# Patient Record
Sex: FEMALE | Race: BLACK OR AFRICAN AMERICAN | NOT HISPANIC OR LATINO | URBAN - METROPOLITAN AREA
[De-identification: names, ages, dates, MRNs, and addresses within clinical notes are randomized per-mention and may not be internally consistent; named-entity substitution may affect disease eponyms.]

---

## 2021-03-25 ENCOUNTER — OFFICE VISIT (OUTPATIENT)
Dept: URBAN - METROPOLITAN AREA CLINIC 118 | Facility: CLINIC | Age: 51
End: 2021-03-25
Payer: COMMERCIAL

## 2021-03-25 ENCOUNTER — LAB OUTSIDE AN ENCOUNTER (OUTPATIENT)
Dept: URBAN - METROPOLITAN AREA CLINIC 118 | Facility: CLINIC | Age: 51
End: 2021-03-25

## 2021-03-25 DIAGNOSIS — Z12.11 COLON CANCER SCREENING: ICD-10-CM

## 2021-03-25 PROCEDURE — G9903 PT SCRN TBCO ID AS NON USER: HCPCS | Performed by: INTERNAL MEDICINE

## 2021-03-25 PROCEDURE — 99202 OFFICE O/P NEW SF 15 MIN: CPT | Performed by: INTERNAL MEDICINE

## 2021-03-25 PROCEDURE — G8417 CALC BMI ABV UP PARAM F/U: HCPCS | Performed by: INTERNAL MEDICINE

## 2021-03-25 PROCEDURE — G8427 DOCREV CUR MEDS BY ELIG CLIN: HCPCS | Performed by: INTERNAL MEDICINE

## 2021-03-25 PROCEDURE — 1036F TOBACCO NON-USER: CPT | Performed by: INTERNAL MEDICINE

## 2021-03-25 PROCEDURE — 3017F COLORECTAL CA SCREEN DOC REV: CPT | Performed by: INTERNAL MEDICINE

## 2021-03-25 RX ORDER — ONDANSETRON HYDROCHLORIDE 4 MG/1
1 TABLET TABLET, FILM COATED ORAL
Qty: 3 TABLET | Refills: 0 | OUTPATIENT
Start: 2021-03-25

## 2021-03-25 RX ORDER — IBUPROFEN 800 MG/1
1 TABLET WITH FOOD OR MILK AS NEEDED TABLET ORAL THREE TIMES A DAY
Status: ACTIVE | COMMUNITY

## 2021-03-25 NOTE — HPI-TODAY'S VISIT:
This is a 51 yo female with pmh of HLD and urology surgeries for kidney reflux here for evaluation for colonoscopy. No prior exam. Denies any GI complaints. Maternal grandmother with colon cancer.

## 2021-04-13 ENCOUNTER — OFFICE VISIT (OUTPATIENT)
Dept: URBAN - METROPOLITAN AREA SURGERY CENTER 23 | Facility: SURGERY CENTER | Age: 51
End: 2021-04-13

## 2021-04-13 RX ORDER — IBUPROFEN 800 MG/1
1 TABLET WITH FOOD OR MILK AS NEEDED TABLET ORAL THREE TIMES A DAY
Status: ACTIVE | COMMUNITY

## 2021-04-13 RX ORDER — ONDANSETRON HYDROCHLORIDE 4 MG/1
1 TABLET TABLET, FILM COATED ORAL
Qty: 3 TABLET | Refills: 0 | Status: ACTIVE | COMMUNITY
Start: 2021-03-25

## 2021-10-26 ENCOUNTER — LAB OUTSIDE AN ENCOUNTER (OUTPATIENT)
Dept: URBAN - METROPOLITAN AREA CLINIC 118 | Facility: CLINIC | Age: 51
End: 2021-10-26

## 2021-10-26 ENCOUNTER — CLAIMS CREATED FROM THE CLAIM WINDOW (OUTPATIENT)
Dept: URBAN - METROPOLITAN AREA CLINIC 118 | Facility: CLINIC | Age: 51
End: 2021-10-26
Payer: COMMERCIAL

## 2021-10-26 ENCOUNTER — DASHBOARD ENCOUNTERS (OUTPATIENT)
Age: 51
End: 2021-10-26

## 2021-10-26 DIAGNOSIS — K21.9 GASTROESOPHAGEAL REFLUX DISEASE WITHOUT ESOPHAGITIS: ICD-10-CM

## 2021-10-26 DIAGNOSIS — K76.9 LIVER DISEASE: ICD-10-CM

## 2021-10-26 DIAGNOSIS — Z12.11 SCREEN FOR COLON CANCER: ICD-10-CM

## 2021-10-26 PROCEDURE — 99214 OFFICE O/P EST MOD 30 MIN: CPT | Performed by: INTERNAL MEDICINE

## 2021-10-26 RX ORDER — IBUPROFEN 800 MG/1
1 TABLET WITH FOOD OR MILK AS NEEDED TABLET ORAL THREE TIMES A DAY
Status: DISCONTINUED | COMMUNITY

## 2021-10-26 RX ORDER — ONDANSETRON HYDROCHLORIDE 4 MG/1
1 TABLET TABLET, FILM COATED ORAL
Qty: 3 TABLET | Refills: 0 | Status: DISCONTINUED | COMMUNITY
Start: 2021-03-25

## 2021-10-26 NOTE — HPI-TODAY'S VISIT:
Pt was diagnosed w/ MS now presents for evaluation. Pt reports h/o 'sensitive' stomach based on  diet with no particular food. Reprots some nausea w.o vomiting. Pt feels that she is tasting bile when  she eats and breathes. Pt not on meds for MS.Pt denies bowel changes. pt due for colon cancer screening.

## 2021-10-27 LAB
A/G RATIO: 1.6
ALBUMIN: 4.2
ALKALINE PHOSPHATASE: 82
AST (SGOT): 17
BASO (ABSOLUTE): 0
BASOS: 1
BILIRUBIN, TOTAL: <0.2
BUN/CREATININE RATIO: 10
BUN: 10
CALCIUM: 9
CHLORIDE: 102
CREATININE: 1.01
EGFR IF AFRICN AM: 76
EGFR IF NONAFRICN AM: 66
EOS (ABSOLUTE): 0.2
EOS: 5
GLOBULIN, TOTAL: 2.7
GLUCOSE: 81
HEMATOCRIT: 37.3
HEMATOLOGY COMMENTS:: (no result)
HEMOGLOBIN: 12.2
IMMATURE CELLS: (no result)
IMMATURE GRANS (ABS): 0
IMMATURE GRANULOCYTES: 0
LYMPHS (ABSOLUTE): 2
LYMPHS: 46
MCH: 29.8
MCHC: 32.7
MCV: 91
MONOCYTES(ABSOLUTE): 0.5
MONOCYTES: 11
NEUTROPHILS (ABSOLUTE): 1.6
NEUTROPHILS: 37
NRBC: (no result)
PLATELETS: 278
POTASSIUM: 4.2
PROTEIN, TOTAL: 6.9
RBC: 4.09
RDW: 13.1
SODIUM: 140
WBC: 4.3

## 2021-11-30 PROBLEM — 275978004 SCREENING FOR MALIGNANT NEOPLASM OF COLON: Status: ACTIVE | Noted: 2021-11-30

## 2021-11-30 PROBLEM — 235856003: Status: ACTIVE | Noted: 2021-10-26

## 2021-12-02 ENCOUNTER — TELEPHONE ENCOUNTER (OUTPATIENT)
Dept: URBAN - METROPOLITAN AREA CLINIC 118 | Facility: CLINIC | Age: 51
End: 2021-12-02

## 2021-12-17 PROBLEM — 266435005: Status: ACTIVE | Noted: 2021-10-26

## 2021-12-20 ENCOUNTER — OFFICE VISIT (OUTPATIENT)
Dept: URBAN - METROPOLITAN AREA SURGERY CENTER 23 | Facility: SURGERY CENTER | Age: 51
End: 2021-12-20

## 2021-12-27 ENCOUNTER — TELEPHONE ENCOUNTER (OUTPATIENT)
Dept: URBAN - METROPOLITAN AREA CLINIC 40 | Facility: CLINIC | Age: 51
End: 2021-12-27

## 2021-12-28 ENCOUNTER — OFFICE VISIT (OUTPATIENT)
Dept: URBAN - METROPOLITAN AREA SURGERY CENTER 23 | Facility: SURGERY CENTER | Age: 51
End: 2021-12-28
Payer: COMMERCIAL

## 2021-12-28 ENCOUNTER — CLAIMS CREATED FROM THE CLAIM WINDOW (OUTPATIENT)
Dept: URBAN - METROPOLITAN AREA CLINIC 4 | Facility: CLINIC | Age: 51
End: 2021-12-28
Payer: COMMERCIAL

## 2021-12-28 DIAGNOSIS — K31.89 ACQUIRED DEFORMITY OF DUODENUM: ICD-10-CM

## 2021-12-28 DIAGNOSIS — K21.9 ACID REFLUX: ICD-10-CM

## 2021-12-28 DIAGNOSIS — K22.2 ACQUIRED ESOPHAGEAL RING: ICD-10-CM

## 2021-12-28 DIAGNOSIS — K21.9 GASTRO-ESOPHAGEAL REFLUX DISEASE WITHOUT ESOPHAGITIS: ICD-10-CM

## 2021-12-28 DIAGNOSIS — Z12.11 COLON CANCER SCREENING: ICD-10-CM

## 2021-12-28 DIAGNOSIS — K31.89 DUODENAL ERYTHEMA: ICD-10-CM

## 2021-12-28 PROCEDURE — 43239 EGD BIOPSY SINGLE/MULTIPLE: CPT | Performed by: INTERNAL MEDICINE

## 2021-12-28 PROCEDURE — 45378 DIAGNOSTIC COLONOSCOPY: CPT | Performed by: INTERNAL MEDICINE

## 2021-12-28 PROCEDURE — G8907 PT DOC NO EVENTS ON DISCHARG: HCPCS | Performed by: INTERNAL MEDICINE

## 2021-12-28 PROCEDURE — 88312 SPECIAL STAINS GROUP 1: CPT | Performed by: PATHOLOGY

## 2021-12-28 PROCEDURE — 88305 TISSUE EXAM BY PATHOLOGIST: CPT | Performed by: PATHOLOGY
